# Patient Record
Sex: FEMALE | URBAN - METROPOLITAN AREA
[De-identification: names, ages, dates, MRNs, and addresses within clinical notes are randomized per-mention and may not be internally consistent; named-entity substitution may affect disease eponyms.]

---

## 2020-11-13 ENCOUNTER — OFFICE VISIT (OUTPATIENT)
Dept: URBAN - METROPOLITAN AREA CLINIC 35 | Facility: CLINIC | Age: 35
End: 2020-11-13

## 2020-12-17 ENCOUNTER — OFFICE VISIT (OUTPATIENT)
Dept: URBAN - METROPOLITAN AREA CLINIC 35 | Facility: CLINIC | Age: 35
End: 2020-12-17

## 2020-12-17 VITALS
SYSTOLIC BLOOD PRESSURE: 118 MMHG | WEIGHT: 118 LBS | HEIGHT: 64 IN | BODY MASS INDEX: 20.14 KG/M2 | TEMPERATURE: 98.9 F | DIASTOLIC BLOOD PRESSURE: 78 MMHG | HEART RATE: 76 BPM | OXYGEN SATURATION: 99 %

## 2020-12-17 PROBLEM — 249504006 FLATULENCE: Status: ACTIVE | Noted: 2020-12-17

## 2020-12-17 PROBLEM — 102614006 GENERALIZED ABDOMINAL PAIN: Status: ACTIVE | Noted: 2020-12-17

## 2020-12-17 PROBLEM — 14760008 CONSTIPATION: Status: ACTIVE | Noted: 2020-12-17

## 2020-12-17 RX ORDER — DICYCLOMINE HYDROCHLORIDE 10 MG/1
1 CAPSULE AS NEEDED CAPSULE ORAL THREE TIMES A DAY
Qty: 30 CAPSULE | Refills: 1 | OUTPATIENT
Start: 2020-12-17

## 2020-12-17 RX ORDER — DIAPER,BRIEF,ADULT, DISPOSABLE
AS DIRECTED EACH MISCELLANEOUS
Status: ACTIVE | COMMUNITY

## 2020-12-17 NOTE — HPI-MIGRATED HPI
;   ;     Abdominal Pain :              35 year old female patient presents for abdominal pain consult. Patient admits she has been experiencing symptoms for the pas 2 years .  Patient describes symptoms as painful to touch , she also states she feels a burning sensation after eating certain foods or drinking alcohol. She admits when she was pregnant 2 years ago it would portrude out . She admits previously seeing Dr Nieves of [facility]; who tried PAntoprazole which pt states caused more symptoms. She also had CT completed 06/2020 which only revealed mild constipation.   Patient admits intermittent nausea .  Patient admits having any recent imaging.  Patient denies EGD in the past.   ;   Constipation :      patient presents for constipation consult. Patient states symptoms began August and is progreesively getting worse . Patient admits taking OTC  stool softners  for relief. Patient admits having 2 bowel movement a week. Patient admits stools are hard and pebble like . Patient denies rectal bleeding /melena. Patient admits intermittent  strenuous bowel movements.   Patient denies having recent abdominal Xray.    Patient admits having recent labs in June consisting of CBC, CMP, Amylase and Lipase which all reported normal . She admits having normal Thyroid levels .  ;

## 2020-12-17 NOTE — EXAM-MIGRATED EXAMINATIONS
General Examination : Medical assistant was in the room during the examination;     GENERAL APPEARANCE: - alert, in no acute distress, well developed, well nourished;   ORAL CAVITY: - mallampati class II;   HEART: - no murmurs, regular rate and rhythm, S1, S2 normal;   LUNGS: - clear to auscultation bilaterally, good air movement, no wheezes, rales, rhonchi;   ABDOMEN: - bowel sounds present, no masses palpable, no organomegaly , no rebound tenderness, soft, nontender, nondistended;

## 2021-04-20 ENCOUNTER — TELEPHONE ENCOUNTER (OUTPATIENT)
Dept: URBAN - METROPOLITAN AREA CLINIC 35 | Facility: CLINIC | Age: 36
End: 2021-04-20

## 2022-04-20 ENCOUNTER — OFFICE VISIT (OUTPATIENT)
Dept: URBAN - METROPOLITAN AREA CLINIC 111 | Facility: CLINIC | Age: 37
End: 2022-04-20
Payer: COMMERCIAL

## 2022-04-20 ENCOUNTER — WEB ENCOUNTER (OUTPATIENT)
Dept: URBAN - METROPOLITAN AREA CLINIC 111 | Facility: CLINIC | Age: 37
End: 2022-04-20

## 2022-04-20 VITALS
BODY MASS INDEX: 21.1 KG/M2 | HEIGHT: 64 IN | HEART RATE: 73 BPM | SYSTOLIC BLOOD PRESSURE: 114 MMHG | TEMPERATURE: 98.1 F | DIASTOLIC BLOOD PRESSURE: 79 MMHG | WEIGHT: 123.6 LBS

## 2022-04-20 DIAGNOSIS — K58.0 IRRITABLE BOWEL SYNDROME WITH DIARRHEA: ICD-10-CM

## 2022-04-20 DIAGNOSIS — R10.13 EPIGASTRIC PAIN: ICD-10-CM

## 2022-04-20 DIAGNOSIS — R11.0 NAUSEA: ICD-10-CM

## 2022-04-20 DIAGNOSIS — K92.1 BLOOD IN STOOL: ICD-10-CM

## 2022-04-20 DIAGNOSIS — R10.84 ABDOMINAL PAIN, GENERALIZED: ICD-10-CM

## 2022-04-20 PROBLEM — 197125005: Status: ACTIVE | Noted: 2022-04-20

## 2022-04-20 PROCEDURE — 99214 OFFICE O/P EST MOD 30 MIN: CPT | Performed by: INTERNAL MEDICINE

## 2022-04-20 RX ORDER — DIAPER,BRIEF,ADULT, DISPOSABLE
AS DIRECTED EACH MISCELLANEOUS
Status: ON HOLD | COMMUNITY

## 2022-04-20 RX ORDER — HYOSCYAMINE SULFATE 0.12 MG/1
1 TABLET UNDER THE TONGUE AND ALLOW TO DISSOLVE  AS NEEDED TABLET, ORALLY DISINTEGRATING ORAL THREE TIMES A DAY
Qty: 30 | Refills: 1 | OUTPATIENT
Start: 2022-04-20 | End: 2022-05-10

## 2022-04-20 RX ORDER — DICYCLOMINE HYDROCHLORIDE 10 MG/1
1 CAPSULE AS NEEDED CAPSULE ORAL THREE TIMES A DAY
Qty: 30 CAPSULE | Refills: 1 | Status: ON HOLD | COMMUNITY
Start: 2020-12-17

## 2022-04-20 RX ORDER — RIFAXIMIN 550 MG/1
1 TABLET TABLET ORAL THREE TIMES A DAY
Qty: 42 TABLET | Refills: 2 | OUTPATIENT

## 2022-04-20 NOTE — PHYSICAL EXAM GASTROINTESTINAL
Abdomen , soft, epigastric abd, and bilat low abd tender per palpation.  nondistended , no guarding or rigidity , no masses palpable, normal bowel sounds Liver and Spleen , no hepatomegaly present, liver nontender, spleen not palpable

## 2022-04-20 NOTE — PHYSICAL EXAM CHEST:
no lesions or deformities,  no traumatic injuries,  chest wall non-tender, breathing is unlabored without accessory muscle use, normal breath sounds

## 2022-04-20 NOTE — PHYSICAL EXAM HENT:
Head,  normocephalic,  atraumatic,  Face,  Face within normal limits,  Ears,  External ears within normal limits, Nose/Nasopharynx,  External nose  normal appearance,  Mouth and Throat,  Oral cavity appearance normal,  Lips,  Appearance normal

## 2022-04-20 NOTE — HPI-TODAY'S VISIT:
38 yo female presented for long-standing abdominal pain; who returns for follow-up; patient was last seen in our GI offices by Dr. Judd Felix in 12/2020.  Pt reports experiencing intermittent epigastric pain  worsens after eating, nausea daily but no vomiting, abd bloating, alternating diarrhea and constipation but more diarrhea lately for past few years. Patient describes epigastric pain as tender to touch and protruding out. She had abdominoplasty 3 years ago which didn't make the pain worse. Pt seen Dr Nieves of [facility] who tried pantoprazole which pt states caused more symptoms. She also had CT completed 06/2020 which only revealed mild constipation. Pt also seen Dr. Felix in 12/2020 and was diagnosed with IBS-C. Pt has tried IBGuard with some relief.No prior egd. Pt noticed blood on toilet tissue for last 6 months even when she is not constipated. She reports aunt passed away at age 48 with colon cancer.  She avoids food such eggs, pepper and some fish after childbirth which makes her sxs worsen. Celiac panel was negative previously per pt, labs done recently.  US abd in 4/2021 unremarkable. Denies prior difficulty with anesthesia. No heart or lung diseases.

## 2022-04-25 ENCOUNTER — OFFICE VISIT (OUTPATIENT)
Dept: URBAN - METROPOLITAN AREA CLINIC 111 | Facility: CLINIC | Age: 37
End: 2022-04-25

## 2022-05-02 ENCOUNTER — TELEPHONE ENCOUNTER (OUTPATIENT)
Dept: URBAN - METROPOLITAN AREA CLINIC 12 | Facility: CLINIC | Age: 37
End: 2022-05-02

## 2022-05-05 ENCOUNTER — DASHBOARD ENCOUNTERS (OUTPATIENT)
Age: 37
End: 2022-05-05

## 2022-05-11 ENCOUNTER — OFFICE VISIT (OUTPATIENT)
Dept: URBAN - METROPOLITAN AREA CLINIC 96 | Facility: CLINIC | Age: 37
End: 2022-05-11

## 2022-06-08 ENCOUNTER — OFFICE VISIT (OUTPATIENT)
Dept: URBAN - METROPOLITAN AREA LAB 3 | Facility: LAB | Age: 37
End: 2022-06-08